# Patient Record
Sex: MALE | Race: WHITE | NOT HISPANIC OR LATINO | Employment: UNEMPLOYED | URBAN - METROPOLITAN AREA
[De-identification: names, ages, dates, MRNs, and addresses within clinical notes are randomized per-mention and may not be internally consistent; named-entity substitution may affect disease eponyms.]

---

## 2022-12-14 NOTE — PATIENT DISCUSSION
Continue seeing PCP and taking Meds as prescribed. Spiral Flap Text: The defect edges were debeveled with a #15c scalpel blade.  Given the location of the defect, shape of the defect and the proximity to free margins a spiral flap was deemed most appropriate.  Using a sterile surgical marker, an appropriate rotation flap was drawn incorporating the defect and placing the expected incisions within the relaxed skin tension lines where possible. The area thus outlined was incised deep to adipose tissue with a #15 scalpel blade.  The skin margins were undermined to an appropriate distance in all directions utilizing iris scissors.

## 2023-01-03 ENCOUNTER — COMPREHENSIVE EXAM (OUTPATIENT)
Dept: URBAN - METROPOLITAN AREA CLINIC 27 | Facility: CLINIC | Age: 28
End: 2023-01-03

## 2023-01-03 DIAGNOSIS — H52.13: ICD-10-CM

## 2023-01-03 PROCEDURE — 92015 DETERMINE REFRACTIVE STATE: CPT

## 2023-01-03 PROCEDURE — 92310-1 LEVEL 1 CONTACT LENS MANAGEMENT

## 2023-01-03 PROCEDURE — 92014 COMPRE OPH EXAM EST PT 1/>: CPT

## 2023-01-03 ASSESSMENT — KERATOMETRY
OD_AXISANGLE2_DEGREES: 92
OS_K1POWER_DIOPTERS: 44.50
OD_K1POWER_DIOPTERS: 44.50
OD_K2POWER_DIOPTERS: 46.00
OS_K2POWER_DIOPTERS: 45.75
OD_AXISANGLE_DEGREES: 002
OS_AXISANGLE2_DEGREES: 83
OS_AXISANGLE_DEGREES: 173

## 2023-01-03 ASSESSMENT — VISUAL ACUITY
OS_CC: 20/20
OD_CC: 20/20

## 2023-01-03 ASSESSMENT — TONOMETRY
OD_IOP_MMHG: 15
OS_IOP_MMHG: 15